# Patient Record
Sex: MALE | Race: WHITE | URBAN - METROPOLITAN AREA
[De-identification: names, ages, dates, MRNs, and addresses within clinical notes are randomized per-mention and may not be internally consistent; named-entity substitution may affect disease eponyms.]

---

## 2021-03-05 ENCOUNTER — OFFICE VISIT (OUTPATIENT)
Dept: INTERNAL MEDICINE CLINIC | Facility: CLINIC | Age: 41
End: 2021-03-05
Payer: COMMERCIAL

## 2021-03-05 VITALS
BODY MASS INDEX: 30.34 KG/M2 | DIASTOLIC BLOOD PRESSURE: 80 MMHG | RESPIRATION RATE: 18 BRPM | HEIGHT: 72 IN | WEIGHT: 224 LBS | HEART RATE: 78 BPM | SYSTOLIC BLOOD PRESSURE: 130 MMHG | TEMPERATURE: 98 F | OXYGEN SATURATION: 99 %

## 2021-03-05 DIAGNOSIS — R03.0 ELEVATED BP WITHOUT DIAGNOSIS OF HYPERTENSION: ICD-10-CM

## 2021-03-05 DIAGNOSIS — G44.229 CHRONIC TENSION-TYPE HEADACHE, NOT INTRACTABLE: ICD-10-CM

## 2021-03-05 DIAGNOSIS — Z00.00 ENCOUNTER FOR ANNUAL HEALTH EXAMINATION: Primary | ICD-10-CM

## 2021-03-05 DIAGNOSIS — R22.2 ABDOMINAL WALL LUMP: ICD-10-CM

## 2021-03-05 DIAGNOSIS — M79.671 PAIN OF RIGHT HEEL: ICD-10-CM

## 2021-03-05 DIAGNOSIS — Z72.820 POOR SLEEP: ICD-10-CM

## 2021-03-05 DIAGNOSIS — M26.621 ARTHRALGIA OF RIGHT TEMPOROMANDIBULAR JOINT: ICD-10-CM

## 2021-03-05 PROCEDURE — 3079F DIAST BP 80-89 MM HG: CPT | Performed by: INTERNAL MEDICINE

## 2021-03-05 PROCEDURE — 3075F SYST BP GE 130 - 139MM HG: CPT | Performed by: INTERNAL MEDICINE

## 2021-03-05 PROCEDURE — 3008F BODY MASS INDEX DOCD: CPT | Performed by: INTERNAL MEDICINE

## 2021-03-05 PROCEDURE — 99395 PREV VISIT EST AGE 18-39: CPT | Performed by: INTERNAL MEDICINE

## 2021-03-06 NOTE — PROGRESS NOTES
HPI:    Patient ID: Kelsea Martinez is a 47year old male.     HPI  Patient comes in for annual physical overall is doing okay is good few complaints today  He says he has been having some tension-like headache mostly in his feet for some time patient admits t Smokeless tobacco: Never Used    Alcohol use: Yes      Alcohol/week: 0.0 standard drinks      Comment: socially    Drug use: Never       PHYSICAL EXAM:    Physical Exam   Constitutional: He is oriented to person, place, and time.  He appears well-develop Differential W Platelet [E] Normal      Lipid Panel [E], normal      Comp Metabolic Panel (14) [E] Normal      TSH W Reflex To Free T4 [E], normal      Urinalysis, Routine, normal      Meds This Visit:  Requested Prescriptions      No prescriptions request

## 2022-03-07 ENCOUNTER — OFFICE VISIT (OUTPATIENT)
Dept: INTERNAL MEDICINE CLINIC | Facility: CLINIC | Age: 42
End: 2022-03-07
Payer: COMMERCIAL

## 2022-03-07 VITALS
HEART RATE: 98 BPM | WEIGHT: 231 LBS | DIASTOLIC BLOOD PRESSURE: 80 MMHG | SYSTOLIC BLOOD PRESSURE: 120 MMHG | HEIGHT: 72 IN | RESPIRATION RATE: 19 BRPM | TEMPERATURE: 98 F | BODY MASS INDEX: 31.29 KG/M2 | OXYGEN SATURATION: 99 %

## 2022-03-07 DIAGNOSIS — M54.50 ACUTE BILATERAL LOW BACK PAIN WITHOUT SCIATICA: Primary | ICD-10-CM

## 2022-03-07 LAB
APPEARANCE: CLEAR
BILIRUBIN: NEGATIVE
GLUCOSE (URINE DIPSTICK): NEGATIVE MG/DL
KETONES (URINE DIPSTICK): NEGATIVE MG/DL
LEUKOCYTES: NEGATIVE
MULTISTIX EXPIRATION DATE: NORMAL DATE
MULTISTIX LOT#: NORMAL NUMERIC
NITRITE, URINE: NEGATIVE
OCCULT BLOOD: NEGATIVE
PH, URINE: 6.5 (ref 4.5–8)
PROTEIN (URINE DIPSTICK): NEGATIVE MG/DL
SPECIFIC GRAVITY: 1.01 (ref 1–1.03)
URINE-COLOR: YELLOW
UROBILINOGEN,SEMI-QN: 0.2 MG/DL (ref 0–1.9)

## 2022-03-07 PROCEDURE — 81003 URINALYSIS AUTO W/O SCOPE: CPT | Performed by: INTERNAL MEDICINE

## 2022-03-07 PROCEDURE — 3074F SYST BP LT 130 MM HG: CPT | Performed by: INTERNAL MEDICINE

## 2022-03-07 PROCEDURE — 3079F DIAST BP 80-89 MM HG: CPT | Performed by: INTERNAL MEDICINE

## 2022-03-07 PROCEDURE — 99213 OFFICE O/P EST LOW 20 MIN: CPT | Performed by: INTERNAL MEDICINE

## 2022-03-07 PROCEDURE — 3008F BODY MASS INDEX DOCD: CPT | Performed by: INTERNAL MEDICINE

## 2022-03-07 RX ORDER — CYCLOBENZAPRINE HCL 5 MG
5 TABLET ORAL 2 TIMES DAILY PRN
Qty: 30 TABLET | Refills: 0 | Status: SHIPPED | OUTPATIENT
Start: 2022-03-07

## 2022-03-07 RX ORDER — METHYLPREDNISOLONE 4 MG/1
TABLET ORAL
Qty: 1 EACH | Refills: 0 | Status: SHIPPED | OUTPATIENT
Start: 2022-03-07

## 2022-04-29 ENCOUNTER — OFFICE VISIT (OUTPATIENT)
Dept: INTERNAL MEDICINE CLINIC | Facility: CLINIC | Age: 42
End: 2022-04-29
Payer: COMMERCIAL

## 2022-04-29 VITALS
BODY MASS INDEX: 30.88 KG/M2 | HEART RATE: 77 BPM | HEIGHT: 72 IN | SYSTOLIC BLOOD PRESSURE: 132 MMHG | TEMPERATURE: 98 F | DIASTOLIC BLOOD PRESSURE: 82 MMHG | OXYGEN SATURATION: 98 % | WEIGHT: 228 LBS | RESPIRATION RATE: 17 BRPM

## 2022-04-29 DIAGNOSIS — K92.1 BLOOD IN STOOL: ICD-10-CM

## 2022-04-29 DIAGNOSIS — R10.84 GENERALIZED ABDOMINAL PAIN: ICD-10-CM

## 2022-04-29 DIAGNOSIS — Z00.00 ANNUAL PHYSICAL EXAM: Primary | ICD-10-CM

## 2022-04-29 DIAGNOSIS — E53.8 LOW VITAMIN B12 LEVEL: ICD-10-CM

## 2022-04-29 PROCEDURE — 3075F SYST BP GE 130 - 139MM HG: CPT | Performed by: INTERNAL MEDICINE

## 2022-04-29 PROCEDURE — 3079F DIAST BP 80-89 MM HG: CPT | Performed by: INTERNAL MEDICINE

## 2022-04-29 PROCEDURE — 99395 PREV VISIT EST AGE 18-39: CPT | Performed by: INTERNAL MEDICINE

## 2022-04-29 PROCEDURE — 3008F BODY MASS INDEX DOCD: CPT | Performed by: INTERNAL MEDICINE

## 2022-04-29 RX ORDER — PANTOPRAZOLE SODIUM 40 MG/1
40 TABLET, DELAYED RELEASE ORAL
Qty: 30 TABLET | Refills: 0 | Status: SHIPPED | OUTPATIENT
Start: 2022-04-29

## 2022-05-17 DIAGNOSIS — R10.84 GENERALIZED ABDOMINAL PAIN: ICD-10-CM

## 2022-05-17 DIAGNOSIS — K21.9 GASTROESOPHAGEAL REFLUX DISEASE, UNSPECIFIED WHETHER ESOPHAGITIS PRESENT: Primary | ICD-10-CM

## 2022-05-17 RX ORDER — SUCRALFATE 1 G/1
1 TABLET ORAL
Qty: 56 TABLET | Refills: 0 | Status: SHIPPED | OUTPATIENT
Start: 2022-05-17 | End: 2022-05-31

## 2022-05-24 ENCOUNTER — TELEPHONE (OUTPATIENT)
Dept: GASTROENTEROLOGY | Facility: CLINIC | Age: 42
End: 2022-05-24

## 2022-05-24 NOTE — TELEPHONE ENCOUNTER
Dr. Johan Carvajal requested patient to be seen sooner, upper GI issues.     GI Staff:   Please call patient to schedule patient with Elbert powell on 5/31 (tUES)

## 2022-05-24 NOTE — TELEPHONE ENCOUNTER
Contacted patient who accepted appt with Yaakov Grey on 5/31 at 11:30a. Location/date/time details provided.      Your appointments     Date & Time Appointment Department [facility])    May 31, 2022 11:30 AM CDT Consult with Marc Blackwell, [address] [place] [address] ([facility])

## 2022-05-31 ENCOUNTER — OFFICE VISIT (OUTPATIENT)
Dept: GASTROENTEROLOGY | Facility: CLINIC | Age: 42
End: 2022-05-31
Payer: COMMERCIAL

## 2022-05-31 ENCOUNTER — TELEPHONE (OUTPATIENT)
Dept: GASTROENTEROLOGY | Facility: CLINIC | Age: 42
End: 2022-05-31

## 2022-05-31 VITALS
BODY MASS INDEX: 31.02 KG/M2 | SYSTOLIC BLOOD PRESSURE: 117 MMHG | DIASTOLIC BLOOD PRESSURE: 78 MMHG | HEIGHT: 72 IN | HEART RATE: 100 BPM | WEIGHT: 229 LBS

## 2022-05-31 DIAGNOSIS — R10.10 UPPER ABDOMINAL PAIN: Primary | ICD-10-CM

## 2022-05-31 DIAGNOSIS — K62.5 BRBPR (BRIGHT RED BLOOD PER RECTUM): ICD-10-CM

## 2022-05-31 DIAGNOSIS — R11.11 VOMITING WITHOUT NAUSEA, UNSPECIFIED VOMITING TYPE: ICD-10-CM

## 2022-05-31 DIAGNOSIS — R10.10 UPPER ABDOMINAL PAIN: ICD-10-CM

## 2022-05-31 DIAGNOSIS — K21.9 GASTROESOPHAGEAL REFLUX DISEASE, UNSPECIFIED WHETHER ESOPHAGITIS PRESENT: Primary | ICD-10-CM

## 2022-05-31 DIAGNOSIS — K21.9 GASTROESOPHAGEAL REFLUX DISEASE, UNSPECIFIED WHETHER ESOPHAGITIS PRESENT: ICD-10-CM

## 2022-05-31 PROCEDURE — 99244 OFF/OP CNSLTJ NEW/EST MOD 40: CPT | Performed by: NURSE PRACTITIONER

## 2022-05-31 PROCEDURE — 3078F DIAST BP <80 MM HG: CPT | Performed by: NURSE PRACTITIONER

## 2022-05-31 PROCEDURE — 3008F BODY MASS INDEX DOCD: CPT | Performed by: NURSE PRACTITIONER

## 2022-05-31 PROCEDURE — 3074F SYST BP LT 130 MM HG: CPT | Performed by: NURSE PRACTITIONER

## 2022-05-31 RX ORDER — SUCRALFATE 1 G/1
1 TABLET ORAL
Qty: 56 TABLET | Refills: 0 | Status: SHIPPED | OUTPATIENT
Start: 2022-05-31 | End: 2022-06-14

## 2022-05-31 RX ORDER — PANTOPRAZOLE SODIUM 40 MG/1
40 TABLET, DELAYED RELEASE ORAL
Qty: 30 TABLET | Refills: 0 | Status: SHIPPED | OUTPATIENT
Start: 2022-05-31

## 2022-05-31 NOTE — TELEPHONE ENCOUNTER
Scheduled for:  Ludmila Gore   Provider Name:  Remigio Rivero  Date:  7/5/22  Location:  [facility]  Sedation:  Mac  Time: 10:45 Am  (pt is aware that [facility] will call the day before to confirm arrival time)   Prep: Egd -Npo 3 hrs prior to procedure arrival  Prep instructions were given to pt in the office, pt verbalized understanding. Meds/Allergies Reconciled?:  Physician reviewed     Diagnosis with codes: Vomiting R11.11, Upper Abd.pain R10.10 , Filemon Overcast K21.9    Was patient informed to call insurance with codes (Y/N):  Yes, I confirmed [employer] [place] insurance with the patient. The patient also verbally understands to call her  insurance to check for pre-cert, codes were given on prep instructions. Referral sent?:  Yes ,Referral was sent at the time of electronic surgical scheduling. [facility] or [facility] notified?: [facility]. Medication Orders: This patient verbally confirmed that he  is not taking:   Iron, blood thinners, BP meds, and is not diabetic   Not latex allergy, Not PCN allergy and does not have a pacemaker Pt is aware to NOT take iron pills, herbal meds and diet supplements for 7 days before exam. Also to NOT take any form of alcohol, recreational drugs and any forms of ED meds 24 hours before exam.    Misc Orders:  Patient was informed that they will need a COVID 19 test prior to their procedure. Patient verbally understood & will await a phone call from [facility] to schedule.       Further instructions given by staff:

## 2022-06-13 ENCOUNTER — HOSPITAL ENCOUNTER (OUTPATIENT)
Dept: ULTRASOUND IMAGING | Age: 42
Discharge: HOME | End: 2022-06-13
Attending: NURSE PRACTITIONER
Payer: COMMERCIAL

## 2022-06-13 DIAGNOSIS — R10.10 UPPER ABDOMINAL PAIN: ICD-10-CM

## 2022-06-13 DIAGNOSIS — K21.9 GASTROESOPHAGEAL REFLUX DISEASE, UNSPECIFIED WHETHER ESOPHAGITIS PRESENT: ICD-10-CM

## 2022-06-13 DIAGNOSIS — R11.11 VOMITING WITHOUT NAUSEA, UNSPECIFIED VOMITING TYPE: ICD-10-CM

## 2022-06-13 PROCEDURE — 76700 US EXAM ABDOM COMPLETE: CPT | Performed by: NURSE PRACTITIONER

## 2022-06-14 ENCOUNTER — PATIENT MESSAGE (OUTPATIENT)
Dept: GASTROENTEROLOGY | Facility: CLINIC | Age: 42
End: 2022-06-14

## 2022-06-14 NOTE — TELEPHONE ENCOUNTER
From: Jo Ann Grayson  To: Renae Alcantara.  Amaris Laguna  Sent: 6/14/2022 9:29 AM CDT  Subject: Question regarding Ultrasound Scan Abdomen    Hello , can someone call me about my ultrasound test, and explain to me

## 2022-06-15 NOTE — TELEPHONE ENCOUNTER
Regarding: Question regarding Ultrasound Scan Abdomen  ----- Message from Justin Gandara RN sent at 6/14/2022  9:34 AM CDT -----       ----- Message sent from Cristina Morris RN to Marilyn Pedroza at 6/14/2022  9:31 AM -----   Je Mitchell,     Your message has been forwarded to [facility] for further advisement. We will be contacting you once we receive a response. Sebastian Mckeon RN      ----- Message -----       From:Jo Ann Grayson       Sent:6/14/2022  9:29 AM CDT         To:Radha Arcos regarding Ultrasound Scan Abdomen    Hello , can someone call me about my ultrasound test, and explain to me

## 2022-06-15 NOTE — TELEPHONE ENCOUNTER
apn contacted pt.  lfts 5/2022 normal.  Advise good cholesterol, hgb a1c control with PCP  Low-fat diet/avoid fatty greasy food. Plan for ged  ctm liver function testing with pcp and consider need for further w/u. Her verbalizes understanding of above and is in agreement with plan.

## 2022-07-05 ENCOUNTER — SURGERY CENTER DOCUMENTATION (OUTPATIENT)
Dept: SURGERY | Age: 42
End: 2022-07-05

## 2022-07-05 ENCOUNTER — LAB REQUISITION (OUTPATIENT)
Dept: SURGERY | Age: 42
End: 2022-07-05
Payer: COMMERCIAL

## 2022-07-05 DIAGNOSIS — R10.0 ACUTE ABDOMEN: ICD-10-CM

## 2022-07-05 DIAGNOSIS — R11.2 NAUSEA AND VOMITING, UNSPECIFIED VOMITING TYPE: ICD-10-CM

## 2022-07-05 DIAGNOSIS — K21.9 GASTROESOPHAGEAL REFLUX DISEASE, UNSPECIFIED WHETHER ESOPHAGITIS PRESENT: ICD-10-CM

## 2022-07-05 PROCEDURE — 88312 SPECIAL STAINS GROUP 1: CPT | Performed by: INTERNAL MEDICINE

## 2022-07-05 PROCEDURE — 88305 TISSUE EXAM BY PATHOLOGIST: CPT | Performed by: INTERNAL MEDICINE

## 2022-07-05 PROCEDURE — 43239 EGD BIOPSY SINGLE/MULTIPLE: CPT | Performed by: INTERNAL MEDICINE

## 2022-07-05 NOTE — PROCEDURES
ESOPHAGOGASTRODUODENOSCOPY (EGD) REPORT    gómez Kenan     1980 Age 46year old   PCP Claire Urban MD Endoscopist Chris Regan MD       Date of procedure: 22    Procedure: EGD w/biopsies    Pre-operative diagnosis: reflux, abdominal pain    Post-operative diagnosis: r/o barretts, gastric erythema and erosions    Medications: MAC sedation    Complications: none    Procedure:  Informed consent was obtained from the patient after the risks of the procedure were discussed, including but not limited to bleeding, perforation, aspiration, infection, or possibility of a missed lesion. After discussions of the risks/benefits and alternatives to this procedure, as well as the planned sedation, the patient was placed in the left lateral decubitus position and begun on continuous blood pressure pulse oximetry and EKG monitoring and this was maintained throughout the procedure. Once an adequate level of sedation was obtained a bite block was placed. Then the lubricated tip of the [id number] diagnostic video upper endoscope was inserted and advanced using direct visualization into the posterior pharynx and ultimately into the esophagus. Complications: None    Findings:      1. Esophagus: The squamocolumnar junction was noted at 40 cm and appeared irregular with two columns extending to 39 cm. The GE junction was noted at 40 cm from the incisors. Hiatus was at 39 so sliding hiatal hernia. The esophageal mucosa appeared normal otherwise. Biopsies taken at 39 to ruleout barretts. 2. Stomach: The stomach distended normally. Normal rugal folds were seen. The pylorus was patent. The gastric mucosa appeared erythematous with erosions so biopsied for Hpylori. Retroflexion revealed a normal fundus and a normal-patulous cardia. 3. Duodenum: The duodenal mucosa appeared normal in the 1st and 2nd portion of the duodenum. Biopsied to rule out celiac    Impression:  1.  Reflux and likely gastritis biopsied as above    Recommend:  1. Here are some reflux precautions:   - Avoid trigger foods  - Anti-reflux measures: raising the head of the bed at night, avoiding tight clothing or belts, avoiding eating late at night and not lying down shortly after mealtime and achieving weight loss   - Avoid NSAID's, caffeine, peppermints, alcohol, tobacco and foods that incite symptoms   2. Await biopsies    >>>If tissue was sampled/removed and you have not received your pathology results either by phone or letter within 2 weeks, please call our office at [phone].     Specimens:  esophogeal at 39, gastric biopsies and duodenal biopsies

## 2023-01-26 ENCOUNTER — OFFICE VISIT (OUTPATIENT)
Dept: INTERNAL MEDICINE CLINIC | Facility: CLINIC | Age: 43
End: 2023-01-26

## 2023-01-26 VITALS
OXYGEN SATURATION: 99 % | HEART RATE: 83 BPM | TEMPERATURE: 98 F | SYSTOLIC BLOOD PRESSURE: 124 MMHG | WEIGHT: 192 LBS | BODY MASS INDEX: 26.01 KG/M2 | DIASTOLIC BLOOD PRESSURE: 86 MMHG | RESPIRATION RATE: 18 BRPM | HEIGHT: 72 IN

## 2023-01-26 DIAGNOSIS — R10.13 EPIGASTRIC PAIN: ICD-10-CM

## 2023-01-26 DIAGNOSIS — Z09 HOSPITAL DISCHARGE FOLLOW-UP: Primary | ICD-10-CM

## 2023-01-26 DIAGNOSIS — R13.10 DYSPHAGIA, UNSPECIFIED TYPE: ICD-10-CM

## 2023-01-26 DIAGNOSIS — E78.5 HYPERLIPIDEMIA, UNSPECIFIED HYPERLIPIDEMIA TYPE: ICD-10-CM

## 2023-01-26 PROCEDURE — 99214 OFFICE O/P EST MOD 30 MIN: CPT | Performed by: INTERNAL MEDICINE

## 2023-01-26 PROCEDURE — 3008F BODY MASS INDEX DOCD: CPT | Performed by: INTERNAL MEDICINE

## 2023-01-26 PROCEDURE — 3074F SYST BP LT 130 MM HG: CPT | Performed by: INTERNAL MEDICINE

## 2023-01-26 PROCEDURE — 3079F DIAST BP 80-89 MM HG: CPT | Performed by: INTERNAL MEDICINE

## 2023-01-26 RX ORDER — PANTOPRAZOLE SODIUM 40 MG/1
40 TABLET, DELAYED RELEASE ORAL
Qty: 30 TABLET | Refills: 0 | Status: SHIPPED | OUTPATIENT
Start: 2023-01-26 | End: 2023-01-30

## 2023-01-30 RX ORDER — PANTOPRAZOLE SODIUM 40 MG/1
40 TABLET, DELAYED RELEASE ORAL
Qty: 90 TABLET | Refills: 1 | Status: SHIPPED | OUTPATIENT
Start: 2023-01-30

## 2023-01-31 ENCOUNTER — TELEPHONE (OUTPATIENT)
Dept: GASTROENTEROLOGY | Facility: CLINIC | Age: 43
End: 2023-01-31

## 2023-01-31 DIAGNOSIS — K22.4 SPASM OF ESOPHAGUS: Primary | ICD-10-CM

## 2023-01-31 NOTE — TELEPHONE ENCOUNTER
----- Message from Reggie Ruano MD sent at 1/30/2023  1:47 PM CST -----  Hi Accutrend SCDs patient will sooner if you can [facility], [facility]. #4 with upper GI symptoms he was doing okay but lately he has been having again pain dysphagia I ordered barium swallow for him but he keeps having these episodes where he gets sudden pain last for 15 to 20 minutes then goes away sounds like esophageal spasm

## 2023-02-01 NOTE — TELEPHONE ENCOUNTER
Spoke to patient and appointment offered and scheduled. Patient agreed to appointment. No further action required at this time.

## 2023-02-05 ENCOUNTER — LAB ENCOUNTER (OUTPATIENT)
Dept: LAB | Facility: HOSPITAL | Age: 43
End: 2023-02-05
Attending: INTERNAL MEDICINE
Payer: COMMERCIAL

## 2023-02-05 DIAGNOSIS — R10.13 EPIGASTRIC PAIN: ICD-10-CM

## 2023-02-05 DIAGNOSIS — R13.10 DYSPHAGIA, UNSPECIFIED TYPE: ICD-10-CM

## 2023-02-06 LAB — SARS-COV-2 RNA RESP QL NAA+PROBE: NOT DETECTED

## 2023-02-08 ENCOUNTER — HOSPITAL ENCOUNTER (OUTPATIENT)
Dept: GENERAL RADIOLOGY | Facility: HOSPITAL | Age: 43
Discharge: HOME | End: 2023-02-08
Attending: INTERNAL MEDICINE
Payer: COMMERCIAL

## 2023-02-08 DIAGNOSIS — R13.10 DYSPHAGIA, UNSPECIFIED TYPE: ICD-10-CM

## 2023-02-08 DIAGNOSIS — R10.13 EPIGASTRIC PAIN: ICD-10-CM

## 2023-02-08 PROCEDURE — 74246 X-RAY XM UPR GI TRC 2CNTRST: CPT | Performed by: INTERNAL MEDICINE

## 2023-03-14 ENCOUNTER — HOSPITAL ENCOUNTER (OUTPATIENT)
Dept: CT IMAGING | Facility: HOSPITAL | Age: 43
Discharge: HOME | End: 2023-03-14
Attending: INTERNAL MEDICINE

## 2023-03-14 VITALS
HEIGHT: 72 IN | DIASTOLIC BLOOD PRESSURE: 88 MMHG | SYSTOLIC BLOOD PRESSURE: 140 MMHG | WEIGHT: 183 LBS | BODY MASS INDEX: 24.79 KG/M2

## 2023-03-14 DIAGNOSIS — E78.5 HYPERLIPIDEMIA, UNSPECIFIED HYPERLIPIDEMIA TYPE: ICD-10-CM

## 2023-03-14 LAB
POCT GLUCOSE CHOLESTECH: 83 (ref 70–99)
POCT HDL: 19 (ref 40–60)
POCT LDL: 131 (ref 0–99)
POCT TOTAL CHOLESTEROL: 168 (ref 110–200)
POCT TRIGLYCERIDES: 92 (ref 1–149)

## 2023-03-15 ENCOUNTER — OFFICE VISIT (OUTPATIENT)
Dept: GASTROENTEROLOGY | Facility: CLINIC | Age: 43
End: 2023-03-15

## 2023-03-15 VITALS
BODY MASS INDEX: 25.6 KG/M2 | SYSTOLIC BLOOD PRESSURE: 124 MMHG | HEART RATE: 84 BPM | HEIGHT: 72 IN | WEIGHT: 189 LBS | DIASTOLIC BLOOD PRESSURE: 73 MMHG

## 2023-03-15 DIAGNOSIS — R74.8 ELEVATED LIVER ENZYMES: ICD-10-CM

## 2023-03-15 DIAGNOSIS — R10.13 EPIGASTRIC PAIN: Primary | ICD-10-CM

## 2023-03-15 PROCEDURE — 99214 OFFICE O/P EST MOD 30 MIN: CPT | Performed by: INTERNAL MEDICINE

## 2023-03-15 PROCEDURE — 3074F SYST BP LT 130 MM HG: CPT | Performed by: INTERNAL MEDICINE

## 2023-03-15 PROCEDURE — 3008F BODY MASS INDEX DOCD: CPT | Performed by: INTERNAL MEDICINE

## 2023-03-15 PROCEDURE — 3078F DIAST BP <80 MM HG: CPT | Performed by: INTERNAL MEDICINE

## 2023-03-15 NOTE — PATIENT INSTRUCTIONS
1. Acid reflux  2. Bloating  3.  Epigastric pain    Recommend:  Given patient's story started with changing diet-- lost weight but intermittent fasting  Likely related to diet and eating habits  FD guard might help  Here are some reflux precautions:   - Avoid trigger foods  - Anti-reflux measures: raising the head of the bed at night, avoiding tight clothing or belts, avoiding eating late at night and not lying down shortly after mealtime and achieving weight loss   - Avoid NSAID's, caffeine, peppermints, alcohol, tobacco and foods that incite symptoms   When reflux worse can take famotidine 20 mg or tums up to 2-3 times a day  Due for colonoscopy 41years old     Given elevated LFTs in ER with plan RUQ US and repeat LFTs

## 2023-03-25 ENCOUNTER — HOSPITAL ENCOUNTER (OUTPATIENT)
Dept: CT IMAGING | Facility: HOSPITAL | Age: 43
Discharge: HOME | End: 2023-03-25
Attending: INTERNAL MEDICINE
Payer: COMMERCIAL

## 2023-03-25 DIAGNOSIS — R91.1 LUNG NODULE: ICD-10-CM

## 2023-03-25 PROCEDURE — 71250 CT THORAX DX C-: CPT | Performed by: INTERNAL MEDICINE

## 2023-03-28 ENCOUNTER — PATIENT MESSAGE (OUTPATIENT)
Facility: CLINIC | Age: 43
End: 2023-03-28

## 2023-03-28 ENCOUNTER — HOSPITAL ENCOUNTER (OUTPATIENT)
Dept: ULTRASOUND IMAGING | Age: 43
Discharge: HOME | End: 2023-03-28
Attending: INTERNAL MEDICINE
Payer: COMMERCIAL

## 2023-03-28 DIAGNOSIS — R74.8 ELEVATED LIVER ENZYMES: ICD-10-CM

## 2023-03-28 DIAGNOSIS — R10.13 EPIGASTRIC PAIN: ICD-10-CM

## 2023-03-28 DIAGNOSIS — R93.2 ABNORMAL GALLBLADDER ULTRASOUND: Primary | ICD-10-CM

## 2023-03-28 PROCEDURE — 76705 ECHO EXAM OF ABDOMEN: CPT | Performed by: INTERNAL MEDICINE

## 2023-03-28 NOTE — TELEPHONE ENCOUNTER
No RUQ pain   Liver less fat-- continue weight loss  Had labs at [facility] and LFTs normal per patient  Did have gas on US, consistent with likely IBS and possible SIBO    Discussed MRI vs repeat RUQ US 6 months if still abnormal will plan MRI  Ok given no symptoms to do repeat US in 6 months

## 2023-03-28 NOTE — TELEPHONE ENCOUNTER
From: Jo Ann Grayson  To: Hillary Kelly MD  Sent: 3/28/2023 9:15 AM CDT  Subject: Bora King Doctor, can you please read my test results and call me , because im so worried .

## 2023-05-26 ENCOUNTER — OFFICE VISIT (OUTPATIENT)
Dept: INTERNAL MEDICINE CLINIC | Facility: CLINIC | Age: 43
End: 2023-05-26

## 2023-05-26 VITALS
WEIGHT: 188 LBS | HEIGHT: 72 IN | BODY MASS INDEX: 25.47 KG/M2 | TEMPERATURE: 98 F | SYSTOLIC BLOOD PRESSURE: 122 MMHG | DIASTOLIC BLOOD PRESSURE: 66 MMHG | HEART RATE: 68 BPM | OXYGEN SATURATION: 97 % | RESPIRATION RATE: 16 BRPM

## 2023-05-26 DIAGNOSIS — G47.00 INSOMNIA, UNSPECIFIED TYPE: ICD-10-CM

## 2023-05-26 DIAGNOSIS — Z00.00 ANNUAL PHYSICAL EXAM: Primary | ICD-10-CM

## 2023-05-26 DIAGNOSIS — E78.5 HYPERLIPIDEMIA, UNSPECIFIED HYPERLIPIDEMIA TYPE: ICD-10-CM

## 2023-05-26 DIAGNOSIS — E53.8 LOW VITAMIN B12 LEVEL: ICD-10-CM

## 2023-05-26 LAB
BILIRUB UR QL: NEGATIVE
CLARITY UR: CLEAR
COLOR UR: COLORLESS
GLUCOSE UR-MCNC: NORMAL MG/DL
HGB UR QL STRIP.AUTO: NEGATIVE
KETONES UR-MCNC: 10 MG/DL
LEUKOCYTE ESTERASE UR QL STRIP.AUTO: NEGATIVE
NITRITE UR QL STRIP.AUTO: NEGATIVE
PH UR: 6.5 [PH] (ref 5–8)
PROT UR-MCNC: NEGATIVE MG/DL
SP GR UR STRIP: <1.005 (ref 1–1.03)
TSI SER-ACNC: 1.41 MIU/ML (ref 0.36–3.74)
UROBILINOGEN UR STRIP-ACNC: NORMAL
VIT B12 SERPL-MCNC: 476 PG/ML (ref 193–986)

## 2023-05-26 PROCEDURE — 3078F DIAST BP <80 MM HG: CPT | Performed by: INTERNAL MEDICINE

## 2023-05-26 PROCEDURE — 3074F SYST BP LT 130 MM HG: CPT | Performed by: INTERNAL MEDICINE

## 2023-05-26 PROCEDURE — 3008F BODY MASS INDEX DOCD: CPT | Performed by: INTERNAL MEDICINE

## 2023-05-26 PROCEDURE — 99396 PREV VISIT EST AGE 40-64: CPT | Performed by: INTERNAL MEDICINE

## 2023-05-26 PROCEDURE — 36415 COLL VENOUS BLD VENIPUNCTURE: CPT | Performed by: INTERNAL MEDICINE

## 2023-05-26 PROCEDURE — 90715 TDAP VACCINE 7 YRS/> IM: CPT | Performed by: INTERNAL MEDICINE

## 2023-05-26 PROCEDURE — 90471 IMMUNIZATION ADMIN: CPT | Performed by: INTERNAL MEDICINE

## 2023-05-26 RX ORDER — MULTIVIT-MIN/IRON/FOLIC ACID/K 18-600-40
1 CAPSULE ORAL DAILY
COMMUNITY

## 2023-05-26 RX ORDER — RIBOFLAVIN (VITAMIN B2) 100 MG
100 TABLET ORAL DAILY
COMMUNITY

## 2023-08-08 ENCOUNTER — TELEPHONE (OUTPATIENT)
Facility: CLINIC | Age: 43
End: 2023-08-08

## 2023-08-08 NOTE — TELEPHONE ENCOUNTER
1st,overdue reminder letter sent to patient via mail and my chart.       COMP METABOLIC PANEL (14) (Order #[id number]) on 3/15/23

## 2023-08-10 ENCOUNTER — OFFICE VISIT (OUTPATIENT)
Dept: INTERNAL MEDICINE CLINIC | Facility: CLINIC | Age: 43
End: 2023-08-10

## 2023-08-10 VITALS
RESPIRATION RATE: 19 BRPM | DIASTOLIC BLOOD PRESSURE: 80 MMHG | SYSTOLIC BLOOD PRESSURE: 124 MMHG | WEIGHT: 186 LBS | HEIGHT: 72 IN | HEART RATE: 87 BPM | BODY MASS INDEX: 25.19 KG/M2 | TEMPERATURE: 98 F | OXYGEN SATURATION: 99 %

## 2023-08-10 DIAGNOSIS — G47.00 INSOMNIA, UNSPECIFIED TYPE: Primary | ICD-10-CM

## 2023-08-10 PROCEDURE — 3079F DIAST BP 80-89 MM HG: CPT | Performed by: INTERNAL MEDICINE

## 2023-08-10 PROCEDURE — 99213 OFFICE O/P EST LOW 20 MIN: CPT | Performed by: INTERNAL MEDICINE

## 2023-08-10 PROCEDURE — 3008F BODY MASS INDEX DOCD: CPT | Performed by: INTERNAL MEDICINE

## 2023-08-10 PROCEDURE — 3074F SYST BP LT 130 MM HG: CPT | Performed by: INTERNAL MEDICINE

## 2023-08-10 RX ORDER — TRAZODONE HYDROCHLORIDE 50 MG/1
50 TABLET ORAL NIGHTLY
Qty: 30 TABLET | Refills: 1 | Status: SHIPPED | OUTPATIENT
Start: 2023-08-10

## 2023-08-14 NOTE — PROGRESS NOTES
Subjective:   Patient ID: Sindhu Cui is a 38year old male. HPI    Pt comes in with complaint of having hard time falling a sleep and this has been going on for few month now. Not every night bt most nights. He says he just sits there in bed and can't fall a sleep for hrs. Denies any anxiety that he knows off, he does have small child at home but he dose not think its bothering him much his wife mostly takes care of him. [facility] but this is not [facility], he does not eat late at night. Lately he has been over thinking this worrying that he will not be able to sleep. History/Other:   Review of Systems   Constitutional: Negative. HENT: Negative. Eyes: Negative. Respiratory: Negative. Cardiovascular: Negative. Gastrointestinal: Negative. Genitourinary: Negative. Musculoskeletal: Negative. Skin: Negative. Neurological: Negative. Psychiatric/Behavioral:  Positive for sleep disturbance. Current Outpatient Medications   Medication Sig Dispense Refill    traZODone 50 MG Oral Tab Take 1 tablet (50 mg total) by mouth nightly. 30 tablet 1    Cholecalciferol (VITAMIN D) 50 MCG (2000 UT) Oral Cap Take 1 capsule (2,000 Units total) by mouth daily. Ascorbic Acid (VITAMIN C) 100 MG Oral Tab Take 1 tablet (100 mg total) by mouth daily. Magnesium 100 MG Oral Tab Take 1 tablet (100 mg total) by mouth daily. Probiotic Product (ALIGN OR) Take by mouth. ZINC OR Use as directed in the mouth or throat. Allergies:No Known Allergies    Objective:   Physical Exam  Vitals and nursing note reviewed. Constitutional:       Appearance: He is well-developed. HENT:      Head: Normocephalic and atraumatic. Right Ear: External ear normal.      Left Ear: External ear normal.      Nose: Nose normal.   Eyes:      Conjunctiva/sclera: Conjunctivae normal.      Pupils: Pupils are equal, round, and reactive to light.    Cardiovascular:      Rate and Rhythm: Normal rate and regular rhythm. Heart sounds: Normal heart sounds. Pulmonary:      Effort: Pulmonary effort is normal.      Breath sounds: Normal breath sounds. Abdominal:      General: Bowel sounds are normal.      Palpations: Abdomen is soft. Genitourinary:     Penis: Normal.       Prostate: Normal.      Rectum: Normal.   Musculoskeletal:         General: Normal range of motion. Cervical back: Normal range of motion and neck supple. Skin:     General: Skin is warm and dry. Neurological:      Mental Status: He is alert and oriented to person, place, and time. Deep Tendon Reflexes: Reflexes are normal and symmetric. Assessment & Plan:   Insomnia, unspecified type  (primary encounter diagnosis) ? Will treat with trazodone 50 mg take 30 min before going to bed, let us know if not better     No orders of the defined types were placed in this encounter. Meds This Visit:  Requested Prescriptions     Signed Prescriptions Disp Refills    traZODone 50 MG Oral Tab 30 tablet 1     Sig: Take 1 tablet (50 mg total) by mouth nightly.        Imaging & Referrals:  None

## 2023-09-01 RX ORDER — TRAZODONE HYDROCHLORIDE 50 MG/1
50 TABLET ORAL NIGHTLY
Qty: 30 TABLET | Refills: 1 | Status: SHIPPED | OUTPATIENT
Start: 2023-09-01

## 2024-04-12 ENCOUNTER — OFFICE VISIT (OUTPATIENT)
Dept: INTERNAL MEDICINE CLINIC | Facility: CLINIC | Age: 44
End: 2024-04-12

## 2024-04-12 VITALS
HEIGHT: 72 IN | OXYGEN SATURATION: 98 % | TEMPERATURE: 98 F | DIASTOLIC BLOOD PRESSURE: 86 MMHG | SYSTOLIC BLOOD PRESSURE: 122 MMHG | HEART RATE: 81 BPM | RESPIRATION RATE: 18 BRPM | WEIGHT: 197 LBS | BODY MASS INDEX: 26.68 KG/M2

## 2024-04-12 DIAGNOSIS — E78.5 HYPERLIPIDEMIA, UNSPECIFIED HYPERLIPIDEMIA TYPE: Primary | ICD-10-CM

## 2024-04-12 DIAGNOSIS — R91.8 LUNG NODULES: ICD-10-CM

## 2024-04-12 DIAGNOSIS — G47.00 INSOMNIA, UNSPECIFIED TYPE: ICD-10-CM

## 2024-04-12 LAB
CHOLEST SERPL-MCNC: 161 MG/DL (ref ?–200)
FASTING PATIENT LIPID ANSWER: YES
HDLC SERPL-MCNC: 42 MG/DL (ref 40–59)
LDLC SERPL CALC-MCNC: 107 MG/DL (ref ?–100)
NONHDLC SERPL-MCNC: 119 MG/DL (ref ?–130)
TRIGL SERPL-MCNC: 60 MG/DL (ref 30–149)
VLDLC SERPL CALC-MCNC: 10 MG/DL (ref 0–30)

## 2024-04-12 PROCEDURE — 96127 BRIEF EMOTIONAL/BEHAV ASSMT: CPT | Performed by: INTERNAL MEDICINE

## 2024-04-12 PROCEDURE — 3074F SYST BP LT 130 MM HG: CPT | Performed by: INTERNAL MEDICINE

## 2024-04-12 PROCEDURE — 99214 OFFICE O/P EST MOD 30 MIN: CPT | Performed by: INTERNAL MEDICINE

## 2024-04-12 PROCEDURE — 36415 COLL VENOUS BLD VENIPUNCTURE: CPT | Performed by: INTERNAL MEDICINE

## 2024-04-12 PROCEDURE — 3008F BODY MASS INDEX DOCD: CPT | Performed by: INTERNAL MEDICINE

## 2024-04-12 PROCEDURE — 3079F DIAST BP 80-89 MM HG: CPT | Performed by: INTERNAL MEDICINE

## 2024-04-12 RX ORDER — TEMAZEPAM 15 MG/1
15 CAPSULE ORAL NIGHTLY PRN
Qty: 30 CAPSULE | Refills: 2 | Status: SHIPPED | OUTPATIENT
Start: 2024-04-12

## 2024-04-12 NOTE — PROGRESS NOTES
Subjective:     Patient ID: Jo Ann Grayson is a 43 year old male.    HPI  Patient comes in for follow-up overall doing okay except for continued problems he says sleep he is taking melatonin will sleep for few hours then wake up in the past treated with trazodone but did not help does not drink much fluid before going to bed or eat    History/Other:   Review of Systems   Constitutional: Negative.    HENT: Negative.     Eyes: Negative.    Respiratory: Negative.     Cardiovascular: Negative.    Gastrointestinal: Negative.    Genitourinary: Negative.    Musculoskeletal: Negative.    Skin: Negative.    Neurological: Negative.    Psychiatric/Behavioral:  Positive for sleep disturbance.      Current Outpatient Medications   Medication Sig Dispense Refill    temazepam 15 MG Oral Cap Take 1 capsule (15 mg total) by mouth nightly as needed for Sleep. 30 capsule 2    traZODone 50 MG Oral Tab Take 1 tablet (50 mg total) by mouth nightly. 30 tablet 1    Cholecalciferol (VITAMIN D) 50 MCG (2000 UT) Oral Cap Take 1 capsule (2,000 Units total) by mouth daily.      Ascorbic Acid (VITAMIN C) 100 MG Oral Tab Take 1 tablet (100 mg total) by mouth daily.      Magnesium 100 MG Oral Tab Take 1 tablet (100 mg total) by mouth daily.      Probiotic Product (ALIGN OR) Take by mouth.      ZINC OR Use as directed in the mouth or throat.       Allergies:No Known Allergies    No past medical history on file.   No past surgical history on file.   No family history on file.   Social History:   Social History     Socioeconomic History    Marital status:    Tobacco Use    Smoking status: Former     Types: Cigarettes    Smokeless tobacco: Never   Vaping Use    Vaping status: Never Used   Substance and Sexual Activity    Alcohol use: Not Currently     Comment: socially    Drug use: Never        Objective:   Physical Exam  Vitals and nursing note reviewed.   Constitutional:       Appearance: He is well-developed.   HENT:      Head: Normocephalic  and atraumatic.      Right Ear: External ear normal.      Left Ear: External ear normal.      Nose: Nose normal.   Eyes:      Conjunctiva/sclera: Conjunctivae normal.      Pupils: Pupils are equal, round, and reactive to light.   Cardiovascular:      Rate and Rhythm: Normal rate and regular rhythm.      Heart sounds: Normal heart sounds.   Pulmonary:      Effort: Pulmonary effort is normal.      Breath sounds: Normal breath sounds.   Abdominal:      General: Bowel sounds are normal.      Palpations: Abdomen is soft.   Genitourinary:     Penis: Normal.       Prostate: Normal.      Rectum: Normal.   Musculoskeletal:         General: Normal range of motion.      Cervical back: Normal range of motion and neck supple.   Skin:     General: Skin is warm and dry.   Neurological:      Mental Status: He is alert and oriented to person, place, and time.      Deep Tendon Reflexes: Reflexes are normal and symmetric.         Assessment & Plan:   1. Hyperlipidemia, unspecified hyperlipidemia type will retest we will follow results    2. Insomnia, unspecified type we will start him on Restoril take as prescribed advised patient not to drive or do any heavy machinery for at least 8 hours after taking the medication    3. Lung nodules history of lung nodules recommendation was to repeat CT in 12 months if no change no need to repeat patient low risk not a smoker       Orders Placed This Encounter   Procedures    Lipid Panel       Meds This Visit:  Requested Prescriptions     Signed Prescriptions Disp Refills    temazepam 15 MG Oral Cap 30 capsule 2     Sig: Take 1 capsule (15 mg total) by mouth nightly as needed for Sleep.       Imaging & Referrals:  CT CHEST (CPT=71250)

## 2024-06-29 ENCOUNTER — OFFICE VISIT (OUTPATIENT)
Dept: INTERNAL MEDICINE CLINIC | Facility: CLINIC | Age: 44
End: 2024-06-29

## 2024-06-29 VITALS
BODY MASS INDEX: 26.95 KG/M2 | SYSTOLIC BLOOD PRESSURE: 126 MMHG | DIASTOLIC BLOOD PRESSURE: 80 MMHG | WEIGHT: 199 LBS | HEIGHT: 72 IN | TEMPERATURE: 98 F | OXYGEN SATURATION: 99 % | RESPIRATION RATE: 17 BRPM | HEART RATE: 108 BPM

## 2024-06-29 DIAGNOSIS — Z00.00 ANNUAL PHYSICAL EXAM: Primary | ICD-10-CM

## 2024-06-29 DIAGNOSIS — R06.83 SNORINGS: ICD-10-CM

## 2024-06-29 DIAGNOSIS — R06.81 APNEA: ICD-10-CM

## 2024-06-29 DIAGNOSIS — R91.8 LUNG NODULES: ICD-10-CM

## 2024-06-29 DIAGNOSIS — G47.00 INSOMNIA, UNSPECIFIED TYPE: ICD-10-CM

## 2024-06-29 DIAGNOSIS — G47.9 SLEEP DISTURBANCE: ICD-10-CM

## 2024-06-29 DIAGNOSIS — E78.5 HYPERLIPIDEMIA, UNSPECIFIED HYPERLIPIDEMIA TYPE: ICD-10-CM

## 2024-06-29 LAB
ALBUMIN SERPL-MCNC: 4.8 G/DL (ref 3.2–4.8)
ALBUMIN/GLOB SERPL: 1.7 {RATIO} (ref 1–2)
ALP LIVER SERPL-CCNC: 89 U/L
ALT SERPL-CCNC: 24 U/L
ANION GAP SERPL CALC-SCNC: 7 MMOL/L (ref 0–18)
AST SERPL-CCNC: 40 U/L (ref ?–34)
BASOPHILS # BLD AUTO: 0.04 X10(3) UL (ref 0–0.2)
BASOPHILS NFR BLD AUTO: 0.6 %
BILIRUB SERPL-MCNC: 0.6 MG/DL (ref 0.3–1.2)
BILIRUB UR QL: NEGATIVE
BUN BLD-MCNC: 15 MG/DL (ref 9–23)
BUN/CREAT SERPL: 13.8 (ref 10–20)
CALCIUM BLD-MCNC: 9.9 MG/DL (ref 8.7–10.4)
CHLORIDE SERPL-SCNC: 108 MMOL/L (ref 98–112)
CHOLEST SERPL-MCNC: 188 MG/DL (ref ?–200)
CLARITY UR: CLEAR
CO2 SERPL-SCNC: 28 MMOL/L (ref 21–32)
COLOR UR: COLORLESS
CREAT BLD-MCNC: 1.09 MG/DL
DEPRECATED RDW RBC AUTO: 39.3 FL (ref 35.1–46.3)
EGFRCR SERPLBLD CKD-EPI 2021: 87 ML/MIN/1.73M2 (ref 60–?)
EOSINOPHIL # BLD AUTO: 0.25 X10(3) UL (ref 0–0.7)
EOSINOPHIL NFR BLD AUTO: 4 %
ERYTHROCYTE [DISTWIDTH] IN BLOOD BY AUTOMATED COUNT: 12.7 % (ref 11–15)
FASTING PATIENT LIPID ANSWER: YES
FASTING STATUS PATIENT QL REPORTED: YES
GLOBULIN PLAS-MCNC: 2.8 G/DL (ref 2–3.5)
GLUCOSE BLD-MCNC: 95 MG/DL (ref 70–99)
GLUCOSE UR-MCNC: NORMAL MG/DL
HCT VFR BLD AUTO: 46 %
HDLC SERPL-MCNC: 46 MG/DL (ref 40–59)
HGB BLD-MCNC: 15.4 G/DL
HGB UR QL STRIP.AUTO: NEGATIVE
IMM GRANULOCYTES # BLD AUTO: 0.02 X10(3) UL (ref 0–1)
IMM GRANULOCYTES NFR BLD: 0.3 %
KETONES UR-MCNC: NEGATIVE MG/DL
LDLC SERPL CALC-MCNC: 130 MG/DL (ref ?–100)
LEUKOCYTE ESTERASE UR QL STRIP.AUTO: NEGATIVE
LYMPHOCYTES # BLD AUTO: 2.51 X10(3) UL (ref 1–4)
LYMPHOCYTES NFR BLD AUTO: 40.4 %
MCH RBC QN AUTO: 28.4 PG (ref 26–34)
MCHC RBC AUTO-ENTMCNC: 33.5 G/DL (ref 31–37)
MCV RBC AUTO: 84.9 FL
MONOCYTES # BLD AUTO: 0.52 X10(3) UL (ref 0.1–1)
MONOCYTES NFR BLD AUTO: 8.4 %
NEUTROPHILS # BLD AUTO: 2.87 X10 (3) UL (ref 1.5–7.7)
NEUTROPHILS # BLD AUTO: 2.87 X10(3) UL (ref 1.5–7.7)
NEUTROPHILS NFR BLD AUTO: 46.3 %
NITRITE UR QL STRIP.AUTO: NEGATIVE
NONHDLC SERPL-MCNC: 142 MG/DL (ref ?–130)
OSMOLALITY SERPL CALC.SUM OF ELEC: 297 MOSM/KG (ref 275–295)
PH UR: 7 [PH] (ref 5–8)
PLATELET # BLD AUTO: 266 10(3)UL (ref 150–450)
POTASSIUM SERPL-SCNC: 4.6 MMOL/L (ref 3.5–5.1)
PROT SERPL-MCNC: 7.6 G/DL (ref 5.7–8.2)
PROT UR-MCNC: NEGATIVE MG/DL
RBC # BLD AUTO: 5.42 X10(6)UL
SODIUM SERPL-SCNC: 143 MMOL/L (ref 136–145)
SP GR UR STRIP: 1 (ref 1–1.03)
TRIGL SERPL-MCNC: 65 MG/DL (ref 30–149)
TSI SER-ACNC: 1.61 MIU/ML (ref 0.55–4.78)
UROBILINOGEN UR STRIP-ACNC: NORMAL
VLDLC SERPL CALC-MCNC: 12 MG/DL (ref 0–30)
WBC # BLD AUTO: 6.2 X10(3) UL (ref 4–11)

## 2024-06-29 PROCEDURE — 36415 COLL VENOUS BLD VENIPUNCTURE: CPT | Performed by: INTERNAL MEDICINE

## 2024-06-29 PROCEDURE — 3079F DIAST BP 80-89 MM HG: CPT | Performed by: INTERNAL MEDICINE

## 2024-06-29 PROCEDURE — 99396 PREV VISIT EST AGE 40-64: CPT | Performed by: INTERNAL MEDICINE

## 2024-06-29 PROCEDURE — 3008F BODY MASS INDEX DOCD: CPT | Performed by: INTERNAL MEDICINE

## 2024-06-29 PROCEDURE — 3074F SYST BP LT 130 MM HG: CPT | Performed by: INTERNAL MEDICINE

## 2024-07-05 NOTE — PROGRESS NOTES
Subjective:     Patient ID: Jo Ann Grayson is a 43 year old male.    HPI  Patient comes in for annual physical overall doing okay except he continues to have problems with sleep  Patient has tried temazepam but has not helped other meds also but not helped falls asleep for few hours then wakes up . He does snore, told to have apnea episodes by wife, not refreshed in am       History/Other:   Review of Systems   Constitutional:  Positive for fatigue.   HENT: Negative.     Eyes: Negative.    Respiratory: Negative.     Cardiovascular: Negative.    Gastrointestinal: Negative.    Genitourinary: Negative.    Musculoskeletal: Negative.    Skin: Negative.    Neurological: Negative.    Psychiatric/Behavioral:  Positive for sleep disturbance.      Current Outpatient Medications   Medication Sig Dispense Refill    temazepam 15 MG Oral Cap Take 1 capsule (15 mg total) by mouth nightly as needed for Sleep. 30 capsule 2    Cholecalciferol (VITAMIN D) 50 MCG (2000 UT) Oral Cap Take 1 capsule (2,000 Units total) by mouth daily.      Ascorbic Acid (VITAMIN C) 100 MG Oral Tab Take 1 tablet (100 mg total) by mouth daily.      Magnesium 100 MG Oral Tab Take 1 tablet (100 mg total) by mouth daily.      Probiotic Product (ALIGN OR) Take by mouth.      ZINC OR Use as directed in the mouth or throat.       Allergies:No Known Allergies    No past medical history on file.   No past surgical history on file.   No family history on file.   Social History:   Social History     Socioeconomic History    Marital status:    Tobacco Use    Smoking status: Former     Types: Cigarettes    Smokeless tobacco: Never   Vaping Use    Vaping status: Never Used   Substance and Sexual Activity    Alcohol use: Not Currently     Comment: socially    Drug use: Never        Objective:   Physical Exam  Constitutional:       Appearance: He is well-developed.   HENT:      Head: Normocephalic and atraumatic.      Right Ear: External ear normal.      Left Ear:  External ear normal.      Nose: Nose normal.   Eyes:      Conjunctiva/sclera: Conjunctivae normal.      Pupils: Pupils are equal, round, and reactive to light.   Cardiovascular:      Rate and Rhythm: Normal rate and regular rhythm.      Heart sounds: Normal heart sounds.   Pulmonary:      Effort: Pulmonary effort is normal.      Breath sounds: Normal breath sounds.   Abdominal:      General: Bowel sounds are normal.      Palpations: Abdomen is soft.   Genitourinary:     Penis: Normal.       Prostate: Normal.      Rectum: Normal.   Musculoskeletal:         General: Normal range of motion.      Cervical back: Normal range of motion and neck supple.   Skin:     General: Skin is warm and dry.   Neurological:      Mental Status: He is alert and oriented to person, place, and time.      Deep Tendon Reflexes: Reflexes are normal and symmetric.         Assessment & Plan:   1. Annual physical exam exam as above we will order labs   2. Hyperlipidemia, unspecified hyperlipidemia type will retest continue current treatment    3. Lung nodules due for repeat CT orders have been done   4. Insomnia, unspecified type patient still struggling with sleep possible sleep apnea will order sleep study    5. Snorings as above we will follow    6. Apnea will follow sleep study   7. Sleep disturbance follow sleep study       Orders Placed This Encounter   Procedures    CBC With Differential With Platelet    Comp Metabolic Panel (14)    Lipid Panel    TSH W Reflex To Free T4    Urinalysis, Routine       Meds This Visit:  Requested Prescriptions      No prescriptions requested or ordered in this encounter       Imaging & Referrals:  [facility] REFERRAL DIAGOSTIC SLEEP STUDY ADULT

## 2024-07-16 ENCOUNTER — HOSPITAL ENCOUNTER (OUTPATIENT)
Dept: CT IMAGING | Facility: HOSPITAL | Age: 44
Discharge: HOME | End: 2024-07-16
Attending: INTERNAL MEDICINE
Payer: COMMERCIAL

## 2024-07-16 DIAGNOSIS — R91.8 LUNG NODULES: ICD-10-CM

## 2024-07-16 PROCEDURE — 71250 CT THORAX DX C-: CPT | Performed by: INTERNAL MEDICINE

## 2024-08-20 RX ORDER — TRIMIPRAMINE MALEATE 25 MG/1
25 CAPSULE ORAL NIGHTLY
Qty: 30 CAPSULE | Refills: 0 | Status: SHIPPED | OUTPATIENT
Start: 2024-08-20

## 2024-08-20 NOTE — TELEPHONE ENCOUNTER
Please review. Protocol Failed; No Protocol    Requested Prescriptions   Pending Prescriptions Disp Refills    TRIMIPRAMINE MALEATE 25 MG Oral Cap [Pharmacy Med Name: TRIMIPRAMINE MALEATE 25 MG CAP] 30 capsule 0     Sig: TAKE 1 CAPSULE BY MOUTH EVERY DAY NIGHTLY       There is no refill protocol information for this order            Future Appointments         Provider Department Appt Notes    In 4 weeks Logan Vallejo MD [facility] Fatty liver - Elevated LFTs   (Policy informed)    In 1 month [facility] SLEEP ROOMS [facility] [id number] [address]          Recent Outpatient Visits              [facility] month ago [facility]    Office Visit    4 months ago Hyperlipidemia, unspecified hyperlipidemia type    [facility] Giacomo Vergara MD    Office Visit    1 year ago Insomnia, unspecified type    [facility] Giacomo Vergara MD    Office Visit    1 year ago Annual physical exam    [facility] Giacomo Vergara MD    Office Visit    1 year ago Epigastric pain    [facility] Logan Vallejo MD    Office Visit

## 2024-09-11 ENCOUNTER — OFFICE VISIT (OUTPATIENT)
Dept: GASTROENTEROLOGY | Facility: CLINIC | Age: 44
End: 2024-09-11

## 2024-09-11 VITALS
DIASTOLIC BLOOD PRESSURE: 88 MMHG | HEART RATE: 116 BPM | WEIGHT: 202.63 LBS | SYSTOLIC BLOOD PRESSURE: 161 MMHG | HEIGHT: 72 IN | BODY MASS INDEX: 27.44 KG/M2

## 2024-09-11 DIAGNOSIS — K21.9 GASTROESOPHAGEAL REFLUX DISEASE, UNSPECIFIED WHETHER ESOPHAGITIS PRESENT: Primary | ICD-10-CM

## 2024-09-11 DIAGNOSIS — R79.89 ELEVATED LFTS: ICD-10-CM

## 2024-09-11 PROCEDURE — 3079F DIAST BP 80-89 MM HG: CPT | Performed by: INTERNAL MEDICINE

## 2024-09-11 PROCEDURE — 3008F BODY MASS INDEX DOCD: CPT | Performed by: INTERNAL MEDICINE

## 2024-09-11 PROCEDURE — 99214 OFFICE O/P EST MOD 30 MIN: CPT | Performed by: INTERNAL MEDICINE

## 2024-09-11 PROCEDURE — 3077F SYST BP >= 140 MM HG: CPT | Performed by: INTERNAL MEDICINE

## 2024-09-11 NOTE — PROGRESS NOTES
[facility] - Gastroenterology  Clinic Follow-up Visit    Chief Complaint   Patient presents with    Follow - Up     Fatty Liver         Subjective/HPI:   Jo Ann Grayson is a 43 year old male, patient of Dr. Eldridge [facility], who presents for follow up for epigastric pain      Past Visit(s):  Saw Radha Mehta NP 5/2022-----------------------------------------------  Jo Ann Grayson is a 41 year old year-old male without significant PMHx:      he is here today as a referral from his PCP     Generalized abdominal pain    Started approx 1.5 mos ago  Thinks was eating later at night around 8 pm  No change in activity, medication  Had been using melatonin as needed x last year  Stopped melatonin   Has had on-going epigastric fullness/burning     Started on pantoprazole 40 mg started 4 weeks ago  No improvement so added Sucralfate x last 2 weeks  Still having heartburn at night w/ vomiting of food. No nausea  Chronic h/o gerd.  No dysphagia.  No change in appetite and/or unintentional weight loss.  Has gained weight.      Lfts, cbc, b12, tsh all normal     he moves his bowels daily and without recent change. he denies straining and/or incomplete evacuation.  Saw red with bm 1 mos ago and unclear if diet related vs blood. No issues since. No rectal pain. No melena.        3/2023, pt presents for f/u.    Since jan still had more acid reflux and vomiting  Now taking align probiotics and zinc  Did lose weight when told had fatty liver disease  Cut sugar and intermittent fasting  Has changed diet since this started  Always starts after dinner and if eats rice then worse  Also with orange after dinner    Overall, the patient feels well.   Denies any GI symptoms of abdominal pain, nausea, vomiting, change in bowel habits, dyspepsia, dysphagia, hematemesis, hematochezia, or melena. Patient has a bowel movement each day.   Additionally there is no change of appetite, unexpected weight loss, and no reported history of  chest pain or shortness of breath.    09/11/24  Did lose weight own to 186 but now up to 202  RUQ US as below  Went to [facility] and had lap angélica 4/2023  No ERCP per patient  Currently daily but usually every 2-3 days  Filemon campbell seeds        Component      Latest Ref Rng 10/14/2016 4/10/2021 6/29/2024   Glucose      70 - 99 mg/dL 91  96  95    Sodium      136 - 145 mmol/L 140  139  143    Potassium      3.5 - 5.1 mmol/L 4.3  4.6  4.6    Chloride      98 - 112 mmol/L 107  107  108    Carbon Dioxide, Total      21.0 - 32.0 mmol/L 25  25  28.0    BUN      9 - 23 mg/dL 20  19  15    CREATININE      0.70 - 1.30 mg/dL 1.01  1.02  1.09    CALCIUM      8.7 - 10.4 mg/dL 9.4  9.5  9.9    ALT (SGPT)      10 - 49 U/L 50  32  24    AST (SGOT)      <=34 U/L 35  20  40 (H)    ALKALINE PHOSPHATASE      45 - 117 U/L 70   89    Total Bilirubin      0.3 - 1.2 mg/dL 0.6  0.6  0.6    PROTEIN, TOTAL      5.7 - 8.2 g/dL 7.0  7.1  7.6    Albumin      3.2 - 4.8 g/dL 4.4  4.5  4.8    Globulin      2.0 - 3.5 g/dL 2.6  2.6  2.8    A/G Ratio      1.0 - 2.0  1.7  1.7  1.7    ANION GAP      0 - 18 mmol/L 8   7    BUN/CREATININE RATIO      10.0 - 20.0  19.8  NOT APPLICABLE  13.8    CALCULATED OSMOLALITY      275 - 295 mOsm/kg 292   297 (H)    eGFR NON-AFR. AMERICAN      > OR = 60 mL/min/1.73m2 >60  93     eGFR       > OR = 60 mL/min/1.73m2 >60  108     EGFR      >=60 mL/min/1.73m2   87    Patient Fasting for CMP?   Yes    Alkaline Phosphatase      36 - 130 U/L  67          Wt Readings from Last 10 Encounters:   09/11/24 202 lb 9.6 oz (91.9 kg)   06/29/24 199 lb (90.3 kg)   04/12/24 197 lb (89.4 kg)   08/10/23 186 lb (84.4 kg)   05/26/23 188 lb (85.3 kg)   03/15/23 189 lb (85.7 kg)   03/14/23 183 lb (83 kg)   01/26/23 192 lb (87.1 kg)   05/31/22 229 lb (103.9 kg)   04/29/22 228 lb (103.4 kg)       Pertinent Surgical Hx:  - See additional surgical hx below  - No known issues with sedation.  - No known history of sleep apnea.    Prior  endoscopies:  7/2022  EGD  Findings:       1. Esophagus: The squamocolumnar junction was noted at 40 cm and appeared irregular with two columns extending to 39 cm. The GE junction was noted at 40 cm from the incisors. Hiatus was at 41 so sliding hiatal hernia. The esophageal mucosa appeared normal otherwise. Biopsies taken at 39 to ruleout barretts.      2. Stomach: The stomach distended normally. Normal rugal folds were seen. The pylorus was patent. The gastric mucosa appeared erythematous with erosions so biopsied for Hpylori. Retroflexion revealed a normal fundus and a normal-patulous cardia.      3. Duodenum: The duodenal mucosa appeared normal in the 1st and 2nd portion of the duodenum. Biopsied to rule out celiac     Impression:  1. Reflux and likely gastritis biopsied as above     Recommend:  1. Here are some reflux precautions:   - Avoid trigger foods  - Anti-reflux measures: raising the head of the bed at night, avoiding tight clothing or belts, avoiding eating late at night and not lying down shortly after mealtime and achieving weight loss   - Avoid NSAID's, caffeine, peppermints, alcohol, tobacco and foods that incite symptoms   2. Await biopsies     >>>If tissue was sampled/removed and you have not received your pathology results either by phone or letter within 2 weeks, please call our office at [phone].     Specimens:  esophogeal at 39, gastric biopsies and duodenal biopsies  Final Diagnosis:      A. Duodenum; biopsy:  Duodenal mucosa with focal foveolar metaplasia.  Intact villous architecture identified.     B. Stomach; biopsy:  Oxyntic-antral mucosa demonstrating mild chronic inactive gastritis.  Diff-Quik stain negative for Helicobacter pylori organisms (appropriate control).     C. Esophagus at 39 cm; biopsy:  Squamocolumnar junctional mucosa with changes consistent with mild reflux esophagitis.  No goblet cell metaplasia or dysplasia identified.          US 3/28/2023  CONCLUSION:   1. There  is diffuse increase in the echogenicity of the liver compatible with either fatty infiltration, or hepatic parenchymal disease.  However, the overall echogenicity of the liver has decreased since previous study, and the liver size is now normal which may suggest some resolution of the previously suggested fatty infiltration.  Correlate clinically.   2. Abnormal appearance of the gallbladder which is moderately contracted despite the patient having fasted for this this examination.  There is significant new abnormality in the gallbladder of uncertain etiology with linear echogenic partially shadowing  material adherent to the periphery of the inner gallbladder wall which is not typical of sludge, polyps or stones.  No well-defined soft tissue mass is seen although further evaluation with MRI scanning of the gallbladder with contrast is advised to further evaluate.  HIDA scanning may also help to further evaluate.  No biliary obstruction.  Correlate clinically.   3. The pancreas is completely obscured by bowel gas.  Normal right kidney.       Social Hx:  - No smoking/etoh  - Denies illicit drug use   - NSAIDs/ASA use: PRN      History, Medications, Allergies, ROS:      No past medical history on file.   Past Surgical History:   Procedure Laterality Date    Removal gallbladder      gallstones      No family history on file.   Social History:   Social History     Socioeconomic History    Marital status:    Tobacco Use    Smoking status: Former     Current packs/day: 0.00     Types: Cigarettes     Quit date:      Years since quittin.6    Smokeless tobacco: Never   Vaping Use    Vaping status: Never Used   Substance and Sexual Activity    Alcohol use: Not Currently     Comment: socially    Drug use: Never        Medications (Active prior to today's visit):  Current Outpatient Medications   Medication Sig Dispense Refill    Trimipramine Maleate 25 MG Oral Cap Take 1 capsule (25 mg total) by mouth  nightly. 30 capsule 0    temazepam 15 MG Oral Cap Take 1 capsule (15 mg total) by mouth nightly as needed for Sleep. 30 capsule 2    Cholecalciferol (VITAMIN D) 50 MCG (2000 UT) Oral Cap Take 1 capsule (2,000 Units total) by mouth daily.      Ascorbic Acid (VITAMIN C) 100 MG Oral Tab Take 1 tablet (100 mg total) by mouth daily.      Magnesium 100 MG Oral Tab Take 1 tablet (100 mg total) by mouth daily.      Probiotic Product (ALIGN OR) Take by mouth.      ZINC OR Use as directed in the mouth or throat.         Allergies:  No Known Allergies    ROS:   CONSTITUTIONAL:  negative for fevers, chills  EYES:  negative for change in vision  RESPIRATORY:  negative for  shortness of breath  CARDIOVASCULAR:  negative for  chest pain  GASTROINTESTINAL:  see HPI  GENITOURINARY:  negative for dysuria and hematuria   SKIN:  negative for  rash  ALLERGIC/IMMUNOLOGIC:  negative for hay fever allergies  ENDOCRINE:  negative for cold intolerance and heat intolerance  MUSCULOSKELETAL:  negative for  joint stiffness and joint swelling  BEHAVIOR/PSYCH:  negative for depressed mood    PHYSICAL EXAM:   Blood pressure (!) 161/88, pulse 116, height 6' (1.829 m), weight 202 lb 9.6 oz (91.9 kg).    Gen: patient appears comfortable and in no acute distress  HEENT: conjunctiva pink, the sclera appears anicteric, oropharynx clear, mucus membranes appear moist  CV: regular rate and rhythm, the extremities are warm and well perfused   Lung: effort normal and breath sounds normal, no respiratory distress, wheezes or rales  GI: soft, non-tender exam in all quadrants without rigidity or guarding, non-distended, no abnormal bowel sounds noted, no masses are palpated  : no CVA tenderness  Skin: dry, warm, no jaundice  Ext: no cyanosis, clubbing or edema is evident.   Neuro: Alert and oriented x4, and patient is having movements of all 4 extremities   Psych: Pt has a normal mood and affect, behavior is normal    Nursing note and vitals  reviewed      Labs/Imaging:     Patient's labs and imaging were reviewed and discussed with patient today.  See HPI and A&P for further details.     .  ASSESSMENT/PLAN:     1. Bloating  2. Epigastric pain and reflux better  3. Elevated LFTs, monitoring. S/p lap angélica. Likely combination fatty liver  4. constipation    Recommend:  Given patient's story started with changing diet-- lost weight but intermittent fasting  Constipation so discussed miralax  FD guard might help  Here are some reflux precautions:   - Avoid trigger foods  - Anti-reflux measures: raising the head of the bed at night, avoiding tight clothing or belts, avoiding eating late at night and not lying down shortly after mealtime and achieving weight loss   - Avoid NSAID's, caffeine, peppermints, alcohol, tobacco and foods that incite symptoms   Not taking PPI anymore or famotidine  Due for colonoscopy 47 years old     BOWEL CLEANSE INSTRUCTIONS:  1. Pick a day you will be at home, close to a toilet.  2. Have a some juice for breakfast.   3. Around 10AM start drinking the solution prescribed (for trilyte, drink 1 cup every 15 minutes) over the course of 3-5 hours. IF having nausea/bloating, take a break for 30 minutes, walk around and then resume drinking. If vomiting, take a break for 1 hour, if symptoms improve, and you feel well, can resume drinking.  4. Goal is to finish solution or until stools turn liquid yellow.  5. For lunch you should have a liquid diet (7up, water, gingerale, etc)  6. After prep, for dinner you can have a light dinner.  7. Resume regular meals the following day, along with laxative medications.  8. You should continue your regular medications during the washout day.     Then miralax daily and fiber after cleanse      Orders This Visit:  No orders of the defined types were placed in this encounter.      Meds This Visit:  Requested Prescriptions      No prescriptions requested or ordered in this encounter       Imaging &  Referrals:  None

## 2024-09-11 NOTE — PATIENT INSTRUCTIONS
1. Bloating  2. Epigastric pain and reflux better  3. Elevated LFTs, monitoring. S/p lap angélica. Likely combination fatty liver  4. constipation    Recommend:  Given patient's story started with changing diet-- lost weight but intermittent fasting  Constipation so discussed miralax  FD guard might help  Here are some reflux precautions:   - Avoid trigger foods  - Anti-reflux measures: raising the head of the bed at night, avoiding tight clothing or belts, avoiding eating late at night and not lying down shortly after mealtime and achieving weight loss   - Avoid NSAID's, caffeine, peppermints, alcohol, tobacco and foods that incite symptoms   Not taking PPI anymore or famotidine  Due for colonoscopy 47 years old     BOWEL CLEANSE INSTRUCTIONS:  1. Pick a day you will be at home, close to a toilet.  2. Have a some juice for breakfast.   3. Around 10AM start drinking miralax every 15 minutes one cup over the course of 3-5 hours. IF having nausea/bloating, take a break for 30 minutes, walk around and then resume drinking. If vomiting, take a break for 1 hour, if symptoms improve, and you feel well, can resume drinking.  4. Goal is to finish solution or until stools turn liquid yellow.  5. For lunch you should have a liquid diet (7up, water, gingerale, etc)  6. After prep, for dinner you can have a light dinner.  7. Resume regular meals the following day, along with laxative medications.  8. You should continue your regular medications during the washout day.     Then miralax daily and fiber after cleanse

## 2024-11-04 ENCOUNTER — TELEPHONE (OUTPATIENT)
Facility: CLINIC | Age: 44
End: 2024-11-04

## 2024-11-04 NOTE — TELEPHONE ENCOUNTER
1st Reminder letter was sent to patient mychart for orders pending:     Comp Metabolic Panel (14) (Order #[id number]) on 9/11/24

## 2025-01-09 ENCOUNTER — TELEPHONE (OUTPATIENT)
Dept: SLEEP CENTER | Age: 45
End: 2025-01-09

## (undated) NOTE — LETTER
8/8/2023              Jo Ann Moran [address] [place] [id number]         Dear Alexander Braun records indicate that the tests ordered for you by Tereza Bauer MD  have not been done. If you have, in fact, already completed the tests or you do not wish to have the tests done, please contact our office at [address] [place] [address]. Otherwise, please proceed with the testing. Enclosed is a duplicate order for your convenience.     Labs Order:    COMP METABOLIC PANEL (14) (Order #[id number]) on 3/15/23       Sincerely,    Tereza Bauer MD  [facility], [address] [address] [phone]

## (undated) NOTE — LETTER
11/4/2024          Jo Ann Grayson    [address] [facility] [id number]         Dear Jo Ann,    Our records indicate that the tests ordered for you by Logan Vallejo MD  have not been done.  If you have, in fact, already completed the tests or you do not wish to have the tests done, please contact our office at THE NUMBER LISTED BELOW.  Otherwise, please proceed with the testing.  Enclosed is a duplicate order for your convenience.     Comp Metabolic Panel (14) (Order #[id number]) on 9/11/24      Sincerely,    Logan Vallejo MD  [address] [address]  [facility] [id number] [phone]